# Patient Record
Sex: FEMALE | Race: WHITE
[De-identification: names, ages, dates, MRNs, and addresses within clinical notes are randomized per-mention and may not be internally consistent; named-entity substitution may affect disease eponyms.]

---

## 2017-07-30 ENCOUNTER — HOSPITAL ENCOUNTER (EMERGENCY)
Dept: HOSPITAL 10 - FTE | Age: 27
Discharge: HOME | End: 2017-07-30
Payer: COMMERCIAL

## 2017-07-30 VITALS
HEIGHT: 62 IN | BODY MASS INDEX: 22.72 KG/M2 | BODY MASS INDEX: 22.72 KG/M2 | WEIGHT: 123.46 LBS | WEIGHT: 123.46 LBS | HEIGHT: 62 IN

## 2017-07-30 DIAGNOSIS — T18.108A: Primary | ICD-10-CM
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
## 2017-07-30 PROCEDURE — 99282 EMERGENCY DEPT VISIT SF MDM: CPT

## 2017-07-30 NOTE — ERA
ER Documentation


Chief Complaint


Date/Time


DATE: 7/30/17 


TIME: 14:04


Chief Complaint


st, feels something stuck in throat





HPI


This is an otherwise of a 27-year-old female presents 2 days after dysphagia 

with burrito.  Patient states that she feels like there is still something in 

her throat when she swallows.  Patient states she is able to tolerate p.o.  

Denies any fever, cough, shortness of breath, dyspnea, or chest pain.  Patient 

has no other complaints and denies any other associated manifestations.  Sugars 

have been reviewed and are consistent with history given.





ROS


All systems reviewed and are negative except as per history of present illness.





Medications


Home Meds


Reported Medications


Prenatal Vits W-Ca,Fe,Fa(<1MG) (Prenatal Vitamins) 1 Tab Tablet, 1 TAB PO DAILY


   6/14/15





Allergies


Allergies:  


Coded Allergies:  


     No Known Allergies (Unverified  Allergy, Unknown, 6/14/15)





Physical Exam


Vitals





Vital Signs








  Date Time  Temp Pulse Resp B/P Pulse Ox O2 Delivery O2 Flow Rate FiO2


 


7/30/17 13:54 98.1 79 18 126/79 99   








Physical Exam


Const: 27-year-old female no acute distress


Head:   Atraumatic 


Eyes:    Normal Conjunctiva


ENT:    Normal External Ears, Nose and Mouth.  No foreign body visualized.


Neck:               Full range of motion..~ No meningismus.  Good air movement 

with auscultation and no bruits


Resp:    Clear to auscultation bilaterally.  Equal chest expansion bilaterally.

  Good air movement.


Cardio:    Regular rate and rhythm, no murmurs


Abd:    Soft, non tender, non distended. Normal bowel sounds


Skin:    No petechiae or rashes


Back:    No midline or flank tenderness


Ext:    No cyanosis, or edema


Neur:    Awake and alert


Psych:    Normal Mood and Affect





Procedures/MDM


Otherwise healthy 27-year-old female presented with a chief complaint of 

dysphagia with prerenal 2 days ago but still feels like there something 

throat.  Patient states that it is not painful or difficult to swallow, but she 

feels like something is stuck in there.  No difficulty with breathing and has 

no respiratory symptoms described in her history.  Physical exam was 

unremarkable.  I have no suspicion for endangerment of the airway at this time.

  Patient received a p.o. fluid challenge which was successfully passed first 

time.  We will go ahead and discharge the patient with discharge instructions 

return precautions as well as close follow-up with PCP for further evaluation 

and possible referral to a specialist for foreign body removal.





Departure


Diagnosis:  


 Primary Impression:  


 Foreign body in esophagus


 Qualified Code:  T18.108A - Foreign body in esophagus, initial encounter


Condition:  Stable





Additional Instructions:  


Follow up with your PCP within the next 1-3 days for a more thorough evaluation 

and a possible referral to a specialist. Return the the emergency department 

immediately if symptoms worsen or change. If you have any questions regarding 

medications, ask your pharmacist or us before you leave. If any adverse 

reactions occur while taking your medications, discontinue the treatment and 

return to the emergency department immediately.  Take your medications as 

directed, and complete the entire course of treatment.











TROY CHATTERJEE PA-C Jul 30, 2017 14:08

## 2018-04-18 ENCOUNTER — HOSPITAL ENCOUNTER (EMERGENCY)
Dept: HOSPITAL 91 - FTE | Age: 28
Discharge: HOME | End: 2018-04-18
Payer: COMMERCIAL

## 2018-04-18 ENCOUNTER — HOSPITAL ENCOUNTER (EMERGENCY)
Age: 28
Discharge: HOME | End: 2018-04-18

## 2018-04-18 DIAGNOSIS — N39.0: Primary | ICD-10-CM

## 2018-04-18 LAB
URINE BLOOD (DIP) POC: (no result)
URINE KETONES (DIP) POC: (no result)
URINE LEUKOCYTE EST (DIP) POC: (no result)
URINE PH (DIP) POC: 5.5 (ref 5–8.5)
URINE TOTAL PROTEIN POC: (no result)

## 2018-04-18 PROCEDURE — 81003 URINALYSIS AUTO W/O SCOPE: CPT

## 2018-04-18 PROCEDURE — 99283 EMERGENCY DEPT VISIT LOW MDM: CPT

## 2018-04-18 PROCEDURE — 81025 URINE PREGNANCY TEST: CPT

## 2019-01-08 ENCOUNTER — HOSPITAL ENCOUNTER (OUTPATIENT)
Dept: HOSPITAL 10 - GIL | Age: 29
Discharge: HOME | End: 2019-01-08
Attending: INTERNAL MEDICINE
Payer: COMMERCIAL

## 2019-01-08 VITALS — WEIGHT: 90.61 LBS | BODY MASS INDEX: 17.79 KG/M2 | HEIGHT: 60 IN

## 2019-01-08 VITALS — RESPIRATION RATE: 21 BRPM | SYSTOLIC BLOOD PRESSURE: 111 MMHG | HEART RATE: 119 BPM | DIASTOLIC BLOOD PRESSURE: 72 MMHG

## 2019-01-08 VITALS — RESPIRATION RATE: 12 BRPM | DIASTOLIC BLOOD PRESSURE: 71 MMHG | HEART RATE: 98 BPM | SYSTOLIC BLOOD PRESSURE: 112 MMHG

## 2019-01-08 DIAGNOSIS — K29.70: ICD-10-CM

## 2019-01-08 DIAGNOSIS — K44.9: Primary | ICD-10-CM

## 2019-01-08 PROCEDURE — 43239 EGD BIOPSY SINGLE/MULTIPLE: CPT

## 2019-01-08 PROCEDURE — 88305 TISSUE EXAM BY PATHOLOGIST: CPT

## 2019-01-08 PROCEDURE — 84703 CHORIONIC GONADOTROPIN ASSAY: CPT

## 2019-01-08 PROCEDURE — 88312 SPECIAL STAINS GROUP 1: CPT

## 2019-01-08 NOTE — PAC
Date/Time of Note


Date/Time of Note


DATE: 1/8/19 


TIME: 14:13





Post-Anesthesia Notes


Post-Anesthesia Note


Last documented vital signs





Vital Signs


  Date      Temp  Pulse  Resp  B/P (MAP)   Pulse Ox  O2          O2 Flow    FiO2


Time                                                 Delivery    Rate


    1/8/19  98.9    119    21      111/72        99  Room Air


     14:13                           (85)





Activity:  WNL


Respiratory function:  WNL


Cardiovascular function:  WNL


Mental status:  Baseline


Pain reasonably controlled:  Yes


Hydration appropriate:  Yes


Nausea/Vomiting absent:  Yes











Julito Singleton M.D.       Jan 8, 2019 14:13

## 2019-01-08 NOTE — PREAC
Date/Time of Note


Date/Time of Note


DATE: 1/8/19 


TIME: 13:39





Anesthesia Eval and Record


Evaluation


Time Pre-Procedure Interview


DATE: 1/8/19 


TIME: 13:39


Age


28


Sex


female


NPO:  8 hrs


Preoperative diagnosis


DYSPHAGIA


Planned procedure


EGD WITH BIOPSIES





Past Medical History


Past Medical History:  None





Surgery & Anesthesia Issues


No known issue





Meds


Anticoagulation:  No


Beta Blocker within 24 hr:  No


Reason Beta Blocker not given:  Pt. not on B-Blocker


Reported Medications


[Birth Control Pill ]   No Conflict Check


   1/8/19


Discontinued Reported Medications


Prenatal Vits W-Ca,Fe,Fa(<1MG) (Prenatal Vitamins) 1 Tab Tablet, 1 TAB PO DAILY


   6/14/15


Discontinued Scripts


Cephalexin* (Cephalexin* Susp) 250 Mg/5 Ml Susp.recon, 10 ML PO BID for 7 Days, 


BOTTLE


   Prov:RANDAL HARTMANN JHON         4/18/18


Meds reviewed:  Yes





Allergies


Coded Allergies:  


     No Known Allergies (Unverified  Allergy, Unknown, 1/8/19)


Allergies Reviewed:  Yes





Labs/Studies


Labs Reviewed:  Reviewed by anesthesiologist


Pregnancy test:  Negative





Pre-procedure Exam


Last vitals





Vital Signs


  Date      Temp  Pulse  Resp  B/P (MAP)   Pulse Ox  O2          O2 Flow    FiO2


Time                                                 Delivery    Rate


    1/8/19  98.9    119    21      111/72        99  Room Air


     12:37                           (85)





Airway:  Adequate mouth opening, Adequate thyromental dist


Mallampati:  Mallampati II


Teeth:  Normal


Lung:  Normal


Heart:  Normal





ASA Physical Status


ASA physical status:  1


Emergency:  None





Planned Anesthetic


General/MAC:  MAC





Planned Pain Management


Parenteral pain med





Pre-operative Attestations


Prior to commencing anesthesia and surgery, the patient was re-evaluated, there 


was verification of:


*The patient's identity


*The results of appropriate recent lab work and preoperative vital signs


*The above evaluation not changing prior to induction


*Anesthetic plan, risk benefits, alternative and complications discussed with 


patient/family; questions answered; patient/family understands, accepts and 


wishes to proceed.











Julito Singleton M.D.       Jan 8, 2019 13:40

## 2019-01-08 NOTE — HPN
Date/Time of Note


Date/Time of Note


DATE: 1/8/19 


TIME: 13:58





Interval H&P Admission Note


Pt. seen H&P reviewed:  No system changes











SID GIPSON                      Jan 8, 2019 13:58